# Patient Record
Sex: FEMALE | Race: WHITE | NOT HISPANIC OR LATINO | Employment: UNEMPLOYED | URBAN - NONMETROPOLITAN AREA
[De-identification: names, ages, dates, MRNs, and addresses within clinical notes are randomized per-mention and may not be internally consistent; named-entity substitution may affect disease eponyms.]

---

## 2024-01-08 ENCOUNTER — HOSPITAL ENCOUNTER (OUTPATIENT)
Dept: RADIOLOGY | Facility: CLINIC | Age: 34
Discharge: HOME | End: 2024-01-08
Payer: COMMERCIAL

## 2024-01-08 ENCOUNTER — OFFICE VISIT (OUTPATIENT)
Dept: OBGYN CLINIC | Facility: CLINIC | Age: 34
End: 2024-01-08
Payer: COMMERCIAL

## 2024-01-08 VITALS
TEMPERATURE: 98.1 F | SYSTOLIC BLOOD PRESSURE: 105 MMHG | WEIGHT: 160 LBS | BODY MASS INDEX: 25.11 KG/M2 | HEIGHT: 67 IN | HEART RATE: 82 BPM | DIASTOLIC BLOOD PRESSURE: 80 MMHG

## 2024-01-08 DIAGNOSIS — G89.29 CHRONIC RIGHT SHOULDER PAIN: ICD-10-CM

## 2024-01-08 DIAGNOSIS — M25.511 CHRONIC RIGHT SHOULDER PAIN: ICD-10-CM

## 2024-01-08 DIAGNOSIS — G89.29 CHRONIC RIGHT SHOULDER PAIN: Primary | ICD-10-CM

## 2024-01-08 DIAGNOSIS — M25.511 CHRONIC RIGHT SHOULDER PAIN: Primary | ICD-10-CM

## 2024-01-08 PROCEDURE — 73030 X-RAY EXAM OF SHOULDER: CPT

## 2024-01-08 PROCEDURE — 99204 OFFICE O/P NEW MOD 45 MIN: CPT | Performed by: ORTHOPAEDIC SURGERY

## 2024-01-08 RX ORDER — OMEPRAZOLE 20 MG/1
CAPSULE, DELAYED RELEASE ORAL
COMMUNITY
Start: 2023-11-08

## 2024-01-08 RX ORDER — CLOBAZAM 20 MG/1
20 TABLET ORAL DAILY
COMMUNITY

## 2024-01-08 RX ORDER — LORAZEPAM 2 MG/1
TABLET ORAL
COMMUNITY
Start: 2023-10-05

## 2024-01-08 NOTE — PROGRESS NOTES
ASSESSMENT/PLAN:    Diagnoses and all orders for this visit:    Chronic right shoulder pain  -     XR shoulder 2+ vw right; Future  -     Ambulatory Referral to Physical Therapy; Future      Plan: I would recommend follow-up in 4 to 6 weeks.  I have recommended a course of physical therapy and a prescription was provided.  Over-the-counter analgesics such as anti-inflammatories and Tylenol may be used.  I have stressed the importance of a long-term home exercise program and she progresses through physical therapy.  She is to contact the office if questions or concerns arise.    Return for 4 to 6 weeks.      _____________________________________________________  CHIEF COMPLAINT:  Chief Complaint   Patient presents with    Right Shoulder - Pain         SUBJECTIVE:  Sabi Moran is a 33 y.o. year old right-handed female who presents for evaluation of chronic right shoulder pain.  She recalls an injury in 1993 triggering shoulder pain.  She was diagnosed with a seizure disorder in 2001 and states she has had multiple seizures over the past several years and generally will land on her right shoulder whenever she suffers a seizure.  She has had symptoms of pain in her shoulder for most of the past 20+ years.  She underwent a course of physical therapy in 2018 with minimal benefit and she does not continue any home exercises.  She does not recall ever having had any diagnostic imaging.  The most recent episode of pain began 9 to 10 months ago and has persisted over that duration of time.  She presents now for initial evaluation.  She states she has diffuse periscapular pain which will radiate to the mid portion of the thoracic spine.  She also does experience some anterior chest wall pain.  She will experience paresthesias in the right upper extremity occurring perhaps once a month and lasting only for several seconds.    PAST MEDICAL HISTORY:  Past Medical History:   Diagnosis Date    ADHD (attention deficit  hyperactivity disorder)     Anemia     Anxiety     Epilepsy (HCC)     Seizure (HCC)        PAST SURGICAL HISTORY:  Past Surgical History:   Procedure Laterality Date    KIDNEY SURGERY Left     VAGUS NERVE STIMULATOR INSERTION         FAMILY HISTORY:  History reviewed. No pertinent family history.    SOCIAL HISTORY:  Social History     Tobacco Use    Smoking status: Never    Smokeless tobacco: Never   Vaping Use    Vaping status: Never Used   Substance Use Topics    Alcohol use: Yes    Drug use: Yes     Types: Marijuana       MEDICATIONS:    Current Outpatient Medications:     cloBAZam (ONFI) 20 MG tablet, Take 20 mg by mouth daily, Disp: , Rfl:     LORazepam (ATIVAN) 2 mg tablet, take 1 tablet by mouth every 8 hours if needed for SEIZURES, Disp: , Rfl:     omeprazole (PriLOSEC) 20 mg delayed release capsule, take 1 capsule by mouth IN THE MORNING 1 hour BEFORE THE FIRST MEAL OF THE DAY, Disp: , Rfl:     Midazolam 5 MG/0.1ML SOLN, Administer 1 spray into one nostril for seizure cluster or prolonged seizure. May repeat in 10 minutes in opposite nostril if initial dose ineffective. No more than 2 doses for seizure cluster. (Patient not taking: Reported on 1/8/2024), Disp: , Rfl:     ALLERGIES:  Allergies   Allergen Reactions    Depakote [Valproic Acid] Other (See Comments)     Pt unable to remember what type of reaction she has        Review of systems:   Constitutional: Negative for fatigue, fever or loss of apetite.   HENT: Negative.    Respiratory: Negative for shortness of breath, dyspnea.    Cardiovascular: Negative for chest pain/tightness.   Gastrointestinal: Negative for abdominal pain, N/V.   Endocrine: Negative for cold/heat intolerance, unexplained weight loss/gain.   Genitourinary: Negative for flank pain, dysuria, hematuria.   Musculoskeletal: Positive as in the HPI  Skin: Negative for rash.    Neurological: Positive as in the HPI  Psychiatric/Behavioral: Negative for  "agitation.  _____________________________________________________  PHYSICAL EXAMINATION:    Blood pressure 105/80, pulse 82, temperature 98.1 °F (36.7 °C), height 5' 7\" (1.702 m), weight 72.6 kg (160 lb).    General: well developed and well nourished, alert, oriented times 3, and appears comfortable  Psychiatric: Normal  HEENT: Benign  Cardiovascular: Regular    Pulmonary: No wheezing or stridor  Abdomen: Soft, Nontender  Skin: No masses, erythema, lacerations, fluctation, ulcerations  Neurovascular: Motor and sensory exams are intact and pulses palpable.    MUSCULOSKELETAL EXAMINATION:    The right shoulder exam demonstrates excellent range of motion, symmetric with contralateral left side.  However, during active abduction and forward flexion she demonstrates a facial grimace and audible complaint of pain.  She has 5/5 strength of internal rotation, external rotation and abduction complaining of aggravation of baseline pain, primarily located in the periscapular region.  She has diffuse periscapular tenderness with a somewhat exaggerated response to palpation of the spinous processes of the thoracic spine, the periscapular musculature and the rib cage.  She denies tenderness over the AC joint or rotator cuff insertion.  She does complain of tenderness to palpation of the biceps tendon sheath.  Lassen's and Jennifer's testing is negative for shoulder complaints although she does complain of periscapular pain.  Abdominal compression and liftoff are negative.  Sulcus sign is negative and there is no evidence of glenohumeral instability.      _____________________________________________________  STUDIES REVIEWED:  X-rays of the shoulder demonstrated no abnormality.          Silvestre Suarez  "

## 2024-05-14 DIAGNOSIS — Z00.6 ENCOUNTER FOR EXAMINATION FOR NORMAL COMPARISON OR CONTROL IN CLINICAL RESEARCH PROGRAM: ICD-10-CM
